# Patient Record
(demographics unavailable — no encounter records)

---

## 2024-10-30 NOTE — PHYSICAL EXAM
[FreeTextEntry1] : Head:  Normocephalic Neck: Supple.  Mental Status:  Alert speaks predominantly Austrian and her daughter interprets.  Disoriented to time impaired short-term memory scored 23/30 on the MMSE on 12/11/2023.  Cranial Nerves:  PERRL, Visual Fields full  EOMI no diplopia no ptosis no nystagmus, V through XII intact.  Motor: No change.  No drift paratonic mild bilateral postural hand tremor.  Good strength.  DTRs: Symmetric and 2+.  Plantars flexor.  No Clonus.  Sensory: Unremarkable.  Gait: Mildly unsteady unchanged.

## 2024-10-30 NOTE — REASON FOR VISIT
[Follow-Up: _____] : a [unfilled] follow-up visit [FreeTextEntry1] : Cognitive decline and status post convulsive syncopal episode without recurrent

## 2024-10-30 NOTE — ASSESSMENT
[FreeTextEntry1] : Impression: This 80-year-old female patient has chronic cognitive complaints that she has had behavioral issues most consistent with neurodegenerative disorder and dementia.  She did have an episode of convulsive syncope.  Neurological exam is stable consistent with dementia.  Following the episode of convulsive syncope the EEG did reveal bitemporal dysfunction but no seizure activity and brain CAT scan was unremarkable.  Recommendations: Continue mirtazapine 15 mg at bedtime, donepezil 10 mg once daily, Xanax 0.25 mg once or twice daily as needed as directed.  Office follow-up in 6 months or as needed.

## 2024-10-30 NOTE — HISTORY OF PRESENT ILLNESS
[FreeTextEntry1] : This patient is seen for an office visit with her daughter.  She was last seen 6/17/2024.  On 6/15/2024 she did have an episode of convulsive syncope after a meal with the family associated with nausea and vomiting.  There was no seizure stigmata.  There has been no recurrence.  CAT scan of the brain was unremarkable at that time.  There were no cardiac issues.  She continues to have cognitive issues.  There is improvement with mirtazapine increased to 15 mg at bedtime.  She occasionally uses Xanax 0.25 mg once daily as needed and she continues donepezil 10 mg at bedtime.  Cognitive status is generally somewhat improved.  She has a part-time aide 5 days a week and lives in apartment with her  and the daughter's house.  She needs assistance with ADL.  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  There were findings suggestive of NPH which has been ruled out in the past.  EEG performed on 6/20/2024 did reveal bilateral independent temporal focal slowing but no epileptiform features.

## 2025-01-14 NOTE — HISTORY OF PRESENT ILLNESS
[Vaginal Wall Prolapse] : no [Urinary Frequency] : no [Feelings Of Urinary Urgency] : no [Urinary Tract Infection] : no [x1] : nocturia once nightly [] : yes [Uses ___ pads per day] : uses [unfilled] pad(s) per day [de-identified] : goes in the depends [de-identified] : sometimes [de-identified] : sometimes [de-identified] : wears 2 depends at night [de-identified] : Frequently in diaper [de-identified] : not sexually active [FreeTextEntry1] : has had 3 UTI's in the last 6 months - asymptomatic

## 2025-01-14 NOTE — REASON FOR VISIT
[Initial Visit ___] : an initial visit for [unfilled] [Patient Declined  Services] : - None: Patient declined  services [Questionnaire Received] : Patient questionnaire received [Intake Form Reviewed] : Patient intake form with past medical history, surgical history, family history and social history reviewed today [Interpreters_FullName] : Ce [TWNoteComboBox1] : Maltese

## 2025-01-14 NOTE — PHYSICAL EXAM
[Chaperone Present] : A chaperone was present in the examining room during all aspects of the physical examination [[id number]] : A chaperone was present during the pelvic exam. [Well developed] : well developed [Well Nourished] : ~L well nourished [Good Hygeine] : demonstrates good hygeine [Normal Mood/Affect] : mood and affect are normal [Warm and Dry] : was warm and dry to touch [Vulvar Atrophy] : vulvar atrophy [Normal Appearance] : general appearance was normal [Atrophy] : atrophy [Normal] : normal [Uterine Adnexae] : were not tender and not enlarged [Cystocele] : a cystocele [Aa ____] : Aa [unfilled] [Ba ____] : Ba [unfilled] [Post Void Residual ____ml] : post void residual was [unfilled] ml [Normal rectal exam] : was normal [FreeTextEntry2] : Paul [Anxiety] : patient is not anxious [Tenderness] : ~T no ~M abdominal tenderness observed [Distended] : not distended

## 2025-01-14 NOTE — REASON FOR VISIT
[Initial Visit ___] : an initial visit for [unfilled] [Patient Declined  Services] : - None: Patient declined  services [Questionnaire Received] : Patient questionnaire received [Intake Form Reviewed] : Patient intake form with past medical history, surgical history, family history and social history reviewed today [Interpreters_FullName] : Ce [TWNoteComboBox1] : Qatari

## 2025-01-14 NOTE — DISCUSSION/SUMMARY
[FreeTextEntry1] : I reviewed the above findings with the patient and her daughter.  With regards to the fecal incontinence we discussed trying FiberCon.  We discussed the urinary incontinence and possible stress incontinence as well as overactive bladder.  We discussed treatment options including doing nothing, intravaginal devices, behavioral modification, medication, outlet as well as urethral bulking.  All questions were answered.  At this point in time she does not desire treatment.  We discussed urodynamic testing if she is interested in management and treatment.  We discussed that urine dipstick was positive for small blood and we will send off a urinalysis with uroscopy and culture.  All questions were answered.  IUGA patient information on overactive bladder and stress incontinence was given to her.

## 2025-01-14 NOTE — HISTORY OF PRESENT ILLNESS
[Vaginal Wall Prolapse] : no [Urinary Frequency] : no [Feelings Of Urinary Urgency] : no [Urinary Tract Infection] : no [x1] : nocturia once nightly [] : yes [Uses ___ pads per day] : uses [unfilled] pad(s) per day [de-identified] : goes in the depends [de-identified] : sometimes [de-identified] : sometimes [de-identified] : wears 2 depends at night [de-identified] : Frequently in diaper [de-identified] : not sexually active [FreeTextEntry1] : has had 3 UTI's in the last 6 months - asymptomatic

## 2025-01-14 NOTE — PHYSICAL EXAM
[Chaperone Present] : A chaperone was present in the examining room during all aspects of the physical examination [[id number]] : A chaperone was present during the pelvic exam. [Well developed] : well developed [Well Nourished] : ~L well nourished [Good Hygeine] : demonstrates good hygeine [Normal Mood/Affect] : mood and affect are normal [Warm and Dry] : was warm and dry to touch [Vulvar Atrophy] : vulvar atrophy [Normal Appearance] : general appearance was normal [Atrophy] : atrophy [Normal] : normal [Uterine Adnexae] : were not tender and not enlarged [Cystocele] : a cystocele [Aa ____] : Aa [unfilled] [Ba ____] : Ba [unfilled] [Post Void Residual ____ml] : post void residual was [unfilled] ml [Normal rectal exam] : was normal [FreeTextEntry2] : Paul [Anxiety] : patient is not anxious [Tenderness] : ~T no ~M abdominal tenderness observed [Distended] : not distended